# Patient Record
Sex: FEMALE | Race: BLACK OR AFRICAN AMERICAN | NOT HISPANIC OR LATINO | URBAN - METROPOLITAN AREA
[De-identification: names, ages, dates, MRNs, and addresses within clinical notes are randomized per-mention and may not be internally consistent; named-entity substitution may affect disease eponyms.]

---

## 2023-11-09 ENCOUNTER — HOSPITAL ENCOUNTER (EMERGENCY)
Facility: HOSPITAL | Age: 53
Discharge: HOME | End: 2023-11-09
Payer: COMMERCIAL

## 2023-11-09 ENCOUNTER — APPOINTMENT (OUTPATIENT)
Dept: RADIOLOGY | Facility: HOSPITAL | Age: 53
End: 2023-11-09
Payer: COMMERCIAL

## 2023-11-09 VITALS
BODY MASS INDEX: 25.71 KG/M2 | DIASTOLIC BLOOD PRESSURE: 87 MMHG | OXYGEN SATURATION: 94 % | HEIGHT: 66 IN | WEIGHT: 160 LBS | RESPIRATION RATE: 16 BRPM | HEART RATE: 65 BPM | SYSTOLIC BLOOD PRESSURE: 189 MMHG | TEMPERATURE: 96.6 F
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
## 2023-11-09 DIAGNOSIS — M79.18 MUSCULOSKELETAL PAIN: Primary | ICD-10-CM

## 2023-11-09 PROCEDURE — 99284 EMERGENCY DEPT VISIT MOD MDM: CPT | Performed by: NURSE PRACTITIONER

## 2023-11-09 PROCEDURE — 73130 X-RAY EXAM OF HAND: CPT | Mod: LT

## 2023-11-09 PROCEDURE — 73130 X-RAY EXAM OF HAND: CPT | Mod: LEFT SIDE | Performed by: STUDENT IN AN ORGANIZED HEALTH CARE EDUCATION/TRAINING PROGRAM

## 2023-11-09 PROCEDURE — 99285 EMERGENCY DEPT VISIT HI MDM: CPT | Mod: 25

## 2023-11-09 PROCEDURE — 99283 EMERGENCY DEPT VISIT LOW MDM: CPT | Mod: 25 | Performed by: NURSE PRACTITIONER

## 2023-11-09 RX ORDER — ACETAMINOPHEN 325 MG/1
650 TABLET ORAL ONCE
Status: COMPLETED | OUTPATIENT
Start: 2023-11-09 | End: 2023-11-09

## 2023-11-09 RX ORDER — METHOCARBAMOL 500 MG/1
500 TABLET, FILM COATED ORAL 2 TIMES DAILY PRN
Qty: 20 TABLET | Refills: 0 | Status: SHIPPED | OUTPATIENT
Start: 2023-11-09 | End: 2023-11-19

## 2023-11-09 RX ORDER — METFORMIN HYDROCHLORIDE 1000 MG/1
1000 TABLET ORAL
COMMUNITY
Start: 2018-08-27

## 2023-11-09 RX ORDER — HYDROCHLOROTHIAZIDE 50 MG/1
25 TABLET ORAL DAILY
COMMUNITY
Start: 2019-01-18

## 2023-11-09 RX ADMIN — ACETAMINOPHEN 650 MG: 325 TABLET ORAL at 21:03

## 2023-11-09 ASSESSMENT — COLUMBIA-SUICIDE SEVERITY RATING SCALE - C-SSRS
2. HAVE YOU ACTUALLY HAD ANY THOUGHTS OF KILLING YOURSELF?: NO
6. HAVE YOU EVER DONE ANYTHING, STARTED TO DO ANYTHING, OR PREPARED TO DO ANYTHING TO END YOUR LIFE?: NO
1. IN THE PAST MONTH, HAVE YOU WISHED YOU WERE DEAD OR WISHED YOU COULD GO TO SLEEP AND NOT WAKE UP?: NO
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
## 2024-11-24 ENCOUNTER — HOSPITAL ENCOUNTER (EMERGENCY)
Facility: HOSPITAL | Age: 54
Discharge: HOME | End: 2024-11-25
Attending: EMERGENCY MEDICINE
Payer: COMMERCIAL

## 2024-11-24 DIAGNOSIS — M54.50 ACUTE EXACERBATION OF CHRONIC LOW BACK PAIN: Primary | ICD-10-CM

## 2024-11-24 DIAGNOSIS — M54.50 LEFT-SIDED LOW BACK PAIN WITHOUT SCIATICA, UNSPECIFIED CHRONICITY: ICD-10-CM

## 2024-11-24 DIAGNOSIS — N30.00 ACUTE CYSTITIS WITHOUT HEMATURIA: ICD-10-CM

## 2024-11-24 DIAGNOSIS — G89.29 ACUTE EXACERBATION OF CHRONIC LOW BACK PAIN: Primary | ICD-10-CM

## 2024-11-24 LAB
ALBUMIN SERPL BCP-MCNC: 5 G/DL (ref 3.4–5)
ALP SERPL-CCNC: 46 U/L (ref 33–110)
ALT SERPL W P-5'-P-CCNC: 15 U/L (ref 7–45)
ANION GAP BLDV CALCULATED.4IONS-SCNC: 17 MMOL/L (ref 10–25)
ANION GAP SERPL CALC-SCNC: 26 MMOL/L (ref 10–20)
AST SERPL W P-5'-P-CCNC: 20 U/L (ref 9–39)
BASE EXCESS BLDV CALC-SCNC: 0.9 MMOL/L (ref -2–3)
BASOPHILS # BLD AUTO: 0.08 X10*3/UL (ref 0–0.1)
BASOPHILS NFR BLD AUTO: 1.2 %
BILIRUB SERPL-MCNC: 0.6 MG/DL (ref 0–1.2)
BODY TEMPERATURE: 37 DEGREES CELSIUS
BUN SERPL-MCNC: 26 MG/DL (ref 6–23)
CA-I BLDV-SCNC: 1.14 MMOL/L (ref 1.1–1.33)
CALCIUM SERPL-MCNC: 10.4 MG/DL (ref 8.6–10.6)
CHLORIDE BLDV-SCNC: 95 MMOL/L (ref 98–107)
CHLORIDE SERPL-SCNC: 92 MMOL/L (ref 98–107)
CO2 SERPL-SCNC: 24 MMOL/L (ref 21–32)
CREAT SERPL-MCNC: 1.15 MG/DL (ref 0.5–1.05)
EGFRCR SERPLBLD CKD-EPI 2021: 56 ML/MIN/1.73M*2
EOSINOPHIL # BLD AUTO: 0.2 X10*3/UL (ref 0–0.7)
EOSINOPHIL NFR BLD AUTO: 2.9 %
ERYTHROCYTE [DISTWIDTH] IN BLOOD BY AUTOMATED COUNT: 12.1 % (ref 11.5–14.5)
GLUCOSE BLD MANUAL STRIP-MCNC: 130 MG/DL (ref 74–99)
GLUCOSE BLDV-MCNC: 127 MG/DL (ref 74–99)
GLUCOSE SERPL-MCNC: 113 MG/DL (ref 74–99)
HCO3 BLDV-SCNC: 26.6 MMOL/L (ref 22–26)
HCT VFR BLD AUTO: 48.2 % (ref 36–46)
HCT VFR BLD EST: 50 % (ref 36–46)
HGB BLD-MCNC: 16.4 G/DL (ref 12–16)
HGB BLDV-MCNC: 16.6 G/DL (ref 12–16)
IMM GRANULOCYTES # BLD AUTO: 0.01 X10*3/UL (ref 0–0.7)
IMM GRANULOCYTES NFR BLD AUTO: 0.1 % (ref 0–0.9)
INHALED O2 CONCENTRATION: 21 %
LACTATE BLDV-SCNC: 1.2 MMOL/L (ref 0.4–2)
LACTATE BLDV-SCNC: 2.2 MMOL/L (ref 0.4–2)
LYMPHOCYTES # BLD AUTO: 2.97 X10*3/UL (ref 1.2–4.8)
LYMPHOCYTES NFR BLD AUTO: 42.9 %
MCH RBC QN AUTO: 29.5 PG (ref 26–34)
MCHC RBC AUTO-ENTMCNC: 34 G/DL (ref 32–36)
MCV RBC AUTO: 87 FL (ref 80–100)
MONOCYTES # BLD AUTO: 0.46 X10*3/UL (ref 0.1–1)
MONOCYTES NFR BLD AUTO: 6.6 %
NEUTROPHILS # BLD AUTO: 3.2 X10*3/UL (ref 1.2–7.7)
NEUTROPHILS NFR BLD AUTO: 46.3 %
NRBC BLD-RTO: 0 /100 WBCS (ref 0–0)
OXYHGB MFR BLDV: 47.4 % (ref 45–75)
PCO2 BLDV: 45 MM HG (ref 41–51)
PH BLDV: 7.38 PH (ref 7.33–7.43)
PLATELET # BLD AUTO: 381 X10*3/UL (ref 150–450)
PO2 BLDV: 32 MM HG (ref 35–45)
POTASSIUM BLDV-SCNC: 3.6 MMOL/L (ref 3.5–5.3)
POTASSIUM SERPL-SCNC: 3.7 MMOL/L (ref 3.5–5.3)
PROT SERPL-MCNC: 8.4 G/DL (ref 6.4–8.2)
RBC # BLD AUTO: 5.56 X10*6/UL (ref 4–5.2)
SAO2 % BLDV: 48 % (ref 45–75)
SODIUM BLDV-SCNC: 135 MMOL/L (ref 136–145)
SODIUM SERPL-SCNC: 138 MMOL/L (ref 136–145)
WBC # BLD AUTO: 6.9 X10*3/UL (ref 4.4–11.3)

## 2024-11-24 PROCEDURE — 36415 COLL VENOUS BLD VENIPUNCTURE: CPT | Performed by: EMERGENCY MEDICINE

## 2024-11-24 PROCEDURE — 84132 ASSAY OF SERUM POTASSIUM: CPT | Mod: 59 | Performed by: EMERGENCY MEDICINE

## 2024-11-24 PROCEDURE — 82947 ASSAY GLUCOSE BLOOD QUANT: CPT

## 2024-11-24 PROCEDURE — 81001 URINALYSIS AUTO W/SCOPE: CPT | Performed by: EMERGENCY MEDICINE

## 2024-11-24 PROCEDURE — 2500000004 HC RX 250 GENERAL PHARMACY W/ HCPCS (ALT 636 FOR OP/ED): Performed by: EMERGENCY MEDICINE

## 2024-11-24 PROCEDURE — 99284 EMERGENCY DEPT VISIT MOD MDM: CPT | Mod: 25

## 2024-11-24 PROCEDURE — 85025 COMPLETE CBC W/AUTO DIFF WBC: CPT | Performed by: EMERGENCY MEDICINE

## 2024-11-24 PROCEDURE — 99285 EMERGENCY DEPT VISIT HI MDM: CPT | Performed by: EMERGENCY MEDICINE

## 2024-11-24 PROCEDURE — 84132 ASSAY OF SERUM POTASSIUM: CPT | Performed by: EMERGENCY MEDICINE

## 2024-11-24 PROCEDURE — 2500000004 HC RX 250 GENERAL PHARMACY W/ HCPCS (ALT 636 FOR OP/ED)

## 2024-11-24 PROCEDURE — 83605 ASSAY OF LACTIC ACID: CPT | Performed by: EMERGENCY MEDICINE

## 2024-11-24 PROCEDURE — 87086 URINE CULTURE/COLONY COUNT: CPT | Performed by: EMERGENCY MEDICINE

## 2024-11-24 PROCEDURE — 96374 THER/PROPH/DIAG INJ IV PUSH: CPT

## 2024-11-24 RX ORDER — HYDROMORPHONE HYDROCHLORIDE 1 MG/ML
1 INJECTION, SOLUTION INTRAMUSCULAR; INTRAVENOUS; SUBCUTANEOUS ONCE
Status: COMPLETED | OUTPATIENT
Start: 2024-11-24 | End: 2024-11-24

## 2024-11-24 RX ORDER — PREDNISONE 20 MG/1
20 TABLET ORAL ONCE
Status: COMPLETED | OUTPATIENT
Start: 2024-11-24 | End: 2024-11-24

## 2024-11-24 RX ORDER — METHYLPREDNISOLONE 4 MG/1
TABLET ORAL
Qty: 21 TABLET | Refills: 0 | Status: SHIPPED | OUTPATIENT
Start: 2024-11-24 | End: 2024-12-01

## 2024-11-24 ASSESSMENT — COLUMBIA-SUICIDE SEVERITY RATING SCALE - C-SSRS
6. HAVE YOU EVER DONE ANYTHING, STARTED TO DO ANYTHING, OR PREPARED TO DO ANYTHING TO END YOUR LIFE?: NO
1. IN THE PAST MONTH, HAVE YOU WISHED YOU WERE DEAD OR WISHED YOU COULD GO TO SLEEP AND NOT WAKE UP?: NO
2. HAVE YOU ACTUALLY HAD ANY THOUGHTS OF KILLING YOURSELF?: NO

## 2024-11-24 ASSESSMENT — PAIN DESCRIPTION - LOCATION: LOCATION: BACK

## 2024-11-24 ASSESSMENT — PAIN DESCRIPTION - PAIN TYPE: TYPE: ACUTE PAIN

## 2024-11-24 ASSESSMENT — PAIN SCALES - GENERAL
PAINLEVEL_OUTOF10: 10 - WORST POSSIBLE PAIN
PAINLEVEL_OUTOF10: 10 - WORST POSSIBLE PAIN

## 2024-11-24 ASSESSMENT — PAIN DESCRIPTION - ORIENTATION: ORIENTATION: LEFT

## 2024-11-24 ASSESSMENT — PAIN - FUNCTIONAL ASSESSMENT: PAIN_FUNCTIONAL_ASSESSMENT: 0-10

## 2024-11-24 NOTE — ED TRIAGE NOTES
Reports back pain/ flank pain since getting switched off insulin and changed to metformin. She was recently admitted @ [facility]. Now patient is concerned because she is getting different readings with finger stick glucose monitor as opposed to CGM. Patient was discharged with follow up with nephrology (finding of atrophic kidney with lesion) but missed her appt. due to severe pain.

## 2024-11-25 VITALS
DIASTOLIC BLOOD PRESSURE: 77 MMHG | HEART RATE: 60 BPM | RESPIRATION RATE: 16 BRPM | BODY MASS INDEX: 19.29 KG/M2 | OXYGEN SATURATION: 97 % | SYSTOLIC BLOOD PRESSURE: 117 MMHG | WEIGHT: 120 LBS | TEMPERATURE: 97.9 F | HEIGHT: 66 IN

## 2024-11-25 LAB
APPEARANCE UR: ABNORMAL
BILIRUB UR STRIP.AUTO-MCNC: ABNORMAL MG/DL
COLOR UR: YELLOW
GLUCOSE UR STRIP.AUTO-MCNC: NORMAL MG/DL
GRAN CASTS #/AREA UR COMP ASSIST: ABNORMAL /LPF
HOLD SPECIMEN: NORMAL
HYALINE CASTS #/AREA URNS AUTO: ABNORMAL /LPF
KETONES UR STRIP.AUTO-MCNC: ABNORMAL MG/DL
LEUKOCYTE ESTERASE UR QL STRIP.AUTO: ABNORMAL
MUCOUS THREADS #/AREA URNS AUTO: ABNORMAL /LPF
NITRITE UR QL STRIP.AUTO: NEGATIVE
PH UR STRIP.AUTO: 5.5 [PH]
PROT UR STRIP.AUTO-MCNC: ABNORMAL MG/DL
RBC # UR STRIP.AUTO: ABNORMAL /UL
RBC #/AREA URNS AUTO: >20 /HPF
SP GR UR STRIP.AUTO: 1.03
SQUAMOUS #/AREA URNS AUTO: ABNORMAL /HPF
UROBILINOGEN UR STRIP.AUTO-MCNC: ABNORMAL MG/DL
WBC #/AREA URNS AUTO: >50 /HPF
WBC CLUMPS #/AREA URNS AUTO: ABNORMAL /HPF

## 2024-11-25 RX ORDER — CEPHALEXIN 500 MG/1
500 CAPSULE ORAL 4 TIMES DAILY
Qty: 28 CAPSULE | Refills: 0 | Status: SHIPPED | OUTPATIENT
Start: 2024-11-25 | End: 2024-12-02

## 2024-11-25 NOTE — DISCHARGE INSTRUCTIONS
You were seen in the emergency department for your low back pain.  It does not appear to be due to any kidney problems.  It appears that you have most of the effective treatments already in place.  Today we discussed adding physical therapy and a steroid in order to give you the best opportunity to heal.  The most important thing is that you keep moving and follow-up with these physical therapist.  Steroid will help with the pain in the short-term but will not fix the issue.  That is why it is so important that you follow-up with a physical therapist    It also appears that you have a urinary tract infection, for this we are going to send you an antibiotic to the pharmacy requested.  Please be sure to use it as directed on the bottle and be sure to take the entire prescription in order to help keep the infection from coming back

## 2024-11-25 NOTE — ED PROVIDER NOTES
History of Present Illness     History provided by: Patient  Limitations to History: None  External Records Reviewed with Brief Summary:  Patient is been seen multiple times for similar pain, see more information below    HPI:  Dinorah Barry is a 54 y.o. female with medical history notable for multiple surgeries on the stomach including as a child and an unspecified kidney mass as an infant which she states she had removed at age 1 day old.  She is coming in today for pain in the left low back which occasionally causes her pain in the left flank as well, pain has been ongoing for approximately 3 weeks.  She has been seen for this multiple times in different emergency departments including admission following CT scans which were notable for her kidney lesion which was known to her, and small volume ascites.   Per her today and based on previous notes, her pain is worse when she is up and moving around and improves with decreased movement and using lidocaine patches and heat.  Based on previous documentation, she had good response to Robaxin which she now denies.  Notes some mild changes in sensation in her RLE around her knee that is only present while she is walking but states that she is able to walk with pain.  She works as a  for [employer] and frequently gets into and out of her car and carries boxes.  She has been worked up multiple times for kidney stones which have not been identified.  Denies saddle anesthesia, loss of bowel or bladder control, fevers, chills, night sweats, N/V (other than the very first day that she had pain), weakness in the legs      Physical Exam   Triage vitals:  T 36.6 °C (97.9 °F)  HR (!) 110  /73  RR 18  O2 98 % None (Room air)    General: Awake, alert, in no acute distress  Eyes: Gaze conjugate.  No scleral icterus or injection  HENT: Normo-cephalic, atraumatic. No stridor  CV: Regular rate, regular rhythm. Radial pulses 2+ bilaterally  Resp: Breathing  non-labored, speaking in full sentences.  Clear to auscultation bilaterally  GI: Soft, non-distended, non-tender. No rebound or guarding.  MSK/Extremities: No gross bony deformities. Moving all extremities.  Is able to ambulate with normal gait.  Walks as though she is having back pain.  Midline spine is nontender, no bony step-offs.  Negative SLR bilaterally.  Negative CVA tenderness  Skin: Warm. Appropriate color.  No rash over the area of pain, the patient does have a lidocaine patch in place over the left buttock  Neuro: Alert. Oriented. Face symmetric. Speech is fluent.  Gross strength and sensation intact in b/l UE and LEs  Psych: Appropriate mood and affect    Medical Decision Making & ED Course   Medical Decision Makin y.o. female with above history and physical notable for low back pain for which she has been seen in the ED multiple times.  She has a history of kidney disease but multiple previous negative workups.  No CVA tenderness.  Due to previous negative workups, chronicity of pain, location of pain, and lack of CVA tenderness, I do not feel that this patient is suffering from a kidney stone or other kidney pathology.  No midline spinal tenderness or abnormal gait other than secondary to pain, denies saddle anesthesia and bowel/bladder incontinence, no fevers, not IV drug user.  For this reason I do not feel that this patient is suffering from an acute spinal pathology.  Examination is most consistent with back pain which is muscular in nature.  This is consistent with her history in which she describes that most helpful things for her are heat and rest.  It is further consistent with her job as an [employer]  frequently carrying boxes.    I had a discussion with the patient regarding previously used medications.  She states that the Dilaudid we gave her did help, I explained to her that opiates over a period of time can worsen outcomes and back pain.  I also explained to her the  benefits of using exercise as a way to help the back heal.  For this reason she agreed to a referral for physical therapy.  She is also using several medications which will continue to help her including lidocaine patches, Tylenol, and Robaxin.  She is not able to take NSAIDs due to her kidney and GI disease so I will continue to avoid these.  I discussed using steroids with her and explained to her that they would likely cause her sugars to increase and that this is an expected outcome.  She was still agreeable to receiving a short burst of steroids.  Will use Medrol Dosepak in order to assist patient with taking appropriately as I feel that the clarity of instructions on the Medrol Dosepak assists patients with getting through the prescribed dosages.    UA was also concerning for UTI.  There are also granular and hyaline casts.  Patient states that she feels that she has been dehydrated due to decreased fluid intake.  Due to critical fluid shortage in this ambulatory patient, I do not feel that it is appropriate to provide IV fluids in the ED.  I recommended that the patient continue to drink water as appropriate.  Additionally for her UTI I am prescribing Keflex.  Allergy to Augmentin is acknowledged but unlikely to be cross-reactive, in the setting of this known anaphylactic allergy I feel that the risk-benefit favors Keflex especially in this patient with several other medication intolerances    ED Course:  ED Course as of 11/25/24 0139   Sun Nov 24, 2024 2039 ED Attending Documentation: This patient was seen by the resident physician.  I have seen and examined the patient, agree with the workup, evaluation, management and diagnosis. I reviewed and edited the above documentation where necessary. On my evaluation of the patient: 54-year-old female who presents to the emergency department with left-sided back pain.  Is been seen multiple times for this and even admitted with concerns that it could be related to  an atrophic kidney however all of her workup at the multiple hospitals have been negative.  Work appears unremarkable, will treat symptomatically and have her follow-up with pain medicine.    Jewel Vasquez MD  EM Attending Physician   [RG]      ED Course User Index  [RG] Jewel Vasquez MD         Diagnoses as of 11/25/24 0139   Left-sided low back pain without sciatica, unspecified chronicity   Acute exacerbation of chronic low back pain   Acute cystitis without hematuria     ---      Social Determinants of Health which Significantly Impact Care: None identified     EKG Independent Interpretation: EKG not obtained    Independent Result Review and Interpretation: Relevant laboratory and radiographic results were reviewed and independently interpreted by myself.  As necessary, they are commented on in the ED Course.    Chronic conditions affecting the patient's care: As documented above in MDM    The patient was discussed with the following consultants/services: None    Care Considerations: As documented above in MDM      Disposition   As a result of the work-up, the patient was discharged home.  she was informed of her diagnosis and instructed to come back with any concerns or worsening of condition.  she and was agreeable to the plan as discussed above.  she was given the opportunity to ask questions.  All of the patient's questions were answered.    Procedures   Procedures    This was a shared visit with an ED attending.  The patient was seen and discussed with the ED attending    Jerson Kim MD  Emergency Medicine     Jerson iKm MD  Resident  11/25/24 0141

## 2024-11-26 LAB — BACTERIA UR CULT: NORMAL

## 2025-01-03 ENCOUNTER — EVALUATION (OUTPATIENT)
Dept: PHYSICAL THERAPY | Facility: CLINIC | Age: 55
End: 2025-01-03
Payer: COMMERCIAL

## 2025-01-03 DIAGNOSIS — M54.50 ACUTE EXACERBATION OF CHRONIC LOW BACK PAIN: ICD-10-CM

## 2025-01-03 DIAGNOSIS — M54.50 LEFT-SIDED LOW BACK PAIN WITHOUT SCIATICA, UNSPECIFIED CHRONICITY: Primary | ICD-10-CM

## 2025-01-03 DIAGNOSIS — G89.29 ACUTE EXACERBATION OF CHRONIC LOW BACK PAIN: ICD-10-CM

## 2025-01-03 PROCEDURE — 97161 PT EVAL LOW COMPLEX 20 MIN: CPT | Mod: GP

## 2025-01-03 PROCEDURE — 97110 THERAPEUTIC EXERCISES: CPT | Mod: GP

## 2025-01-03 NOTE — PROGRESS NOTES
Physical Therapy    Physical Therapy Evaluation and Treatment      Patient Name: Dinorah Barry  MRN: [id number]  Today's Date: 1/3/2025    Time Entry:   Time Calculation  Start Time: 1115  Stop Time: 1150  Time Calculation (min): 35 min  PT Evaluation Time Entry  PT Evaluation (Low) Time Entry: 20  PT Therapeutic Procedures Time Entry  Therapeutic Exercise Time Entry: 15  Insurance: [facility] Dual Complete  Visit: 1   Outcome measure DENICE: 20/50  Number of visits requested: 8  Date range of visits requested: 1/3/25-4/3/25     Assessment:  PT Assessment  Assessment Comment: Patient presents with signs and symptoms consistent with the physician's medical diagnosis of mechanical low back pain. She will benefit from medically necessary skilled physical therapy interventions to decrease pain and improve function with daily activities.     Plan:  OP PT Plan  Treatment/Interventions: Cryotherapy, Dry needling, Education/ Instruction, Electrical stimulation, Hot pack, Manual therapy, Neuromuscular re-education, Therapeutic activities, Therapeutic exercises  PT Plan: Skilled PT  PT Frequency: 1 time per week  Duration: 8 weeks  Certification Period Start Date: 01/03/25  Certification Period End Date: 04/03/25  Rehab Potential: Good  Plan of Care Agreement: Patient    Current Problem:   Problem List Items Addressed This Visit    None  Visit Diagnoses         Codes    Left-sided low back pain without sciatica, unspecified chronicity    -  Primary M54.50    Relevant Orders    Follow Up In Physical Therapy    Acute exacerbation of chronic low back pain     M54.50, G89.29              Subjective    General:  General  Reason for Referral: left sided low back pain  Referred By: Jewel Vasquez MD  Preferred Learning Style: auditory, kinesthetic, verbal, visual, written  General Comment: Patient is a 55 y/o female who was referred to outpatient physical therapy due to left sided low back pain. She presented to the ED on 11/24/24 due to  "low back pain. For pain relief she has used lidocaine patches, Tylenol, Robaxin and steroids. She also sleeps with a hot pack. The patient had a recent UTI which decreased her stamina and strength. Her low back pain is along the spine and the left sided paraspinals. Today she rates her pain at 6/10. She notes intermittent tingling in the right thigh. Patient notes that prolonged sitting increases her pain. The patient is a diabetic. The patient works in security and a goal of hers is to be able to stand for a long period of time.  Precautions:  Precautions  STEADI Fall Risk Score (The score of 4 or more indicates an increased risk of falling): 0  Pain:   6/10  Prior Level of Function:   Independent with all ADLs    Objective   Cognition:   WNL  General Assessments:  Posture Comment: Slightly forward flexed posture while seated.    Range of Motion Comments: Lumbar ROM:     Flexion 75%     Extension 75%      Right Rotation 75%     Left rotation 75%     Right Side Bend 75%     Left side bend 75%     Patient reports an increase in pain at end ROM extension.    Strength Comments: LE strength (R/L)     Hip flexion 4+/5, 4+/5     Hip extension 4+/5, 4+/5     Hip abduction 4+/5, 4+/5     Knee flexion 5/5, 5/5     Knee extension 5/5, 5/5     Ankle plantarflexion 5/5, 5/5     Ankle dorsiflexion 5/5, 5/5     Core strength: 3/5    Palpation Comment: Patient is tender to palpation along spinous processes L4-L5 and left sided lumbar paraspinal musculature.   Special Tests Comment: SLR negative bilaterally  Functional Assessments:  Gait Comment: Normal gait pattern.    Outcome Measures:  DENICE: 20/50    Treatments:  Therapeutic Exercise  Therapeutic Exercise Performed: Yes  Therapeutic Exercise Activity 1: LTR 10 x 5\"  Therapeutic Exercise Activity 2: open book 10 x 10\"  Therapeutic Exercise Activity 3: piriformis stretch 3 x 30\"  Therapeutic Exercise Activity 4: supine PPT 20 x 5\"    EDUCATION:   Home exercise program daily. "     Goals:  Active       PT Problem       Patient will report compliance with her home exercise program.         Start:  01/03/25    Expected End:  04/03/25            Patient will report improvement via the DENICE to demonstrate improved activity tolerance.        Start:  01/03/25    Expected End:  04/03/25            Patient will report a decreased pain level to 0/10.         Start:  01/03/25    Expected End:  04/03/25            Patient will demonstrate improvements in core strength to 5/5 to facilitate bending and lifting.        Start:  01/03/25    Expected End:  04/03/25

## 2025-01-16 ENCOUNTER — DOCUMENTATION (OUTPATIENT)
Dept: PHYSICAL THERAPY | Facility: CLINIC | Age: 55
End: 2025-01-16
Payer: COMMERCIAL

## 2025-01-16 NOTE — PROGRESS NOTES
Physical Therapy                 Therapy Communication Note    Patient Name: Dinorah Barry  MRN: [id number]  Department:   Room: Room/bed info not found  Today's Date: 1/16/2025     Discipline: Physical Therapy          Missed Visit Reason:      Missed Time: Cancel    Comment:

## 2025-01-29 ENCOUNTER — DOCUMENTATION (OUTPATIENT)
Dept: PHYSICAL THERAPY | Facility: CLINIC | Age: 55
End: 2025-01-29
Payer: COMMERCIAL

## 2025-01-29 NOTE — PROGRESS NOTES
Physical Therapy                 Therapy Communication Note    Patient Name: Dinorah Barry  MRN: [id number]  Department:   Room: Room/bed info not found  Today's Date: 2025     Discipline: Physical Therapy          Missed Visit Reason:      Missed Time: No Show    Comment:        Physical Therapy    Discharge Summary    Name: Dinorah Barry  MRN: [id number]  : 1970  Date: 25    Discharge Summary: PT    Discharge Information: Date of discharge 25, Date of last visit 1/3/25, Date of evaluation 1/3/25, Number of attended visits 1, Referred by Jewel Vasquez MD, and Referred for left sided low back pain    Therapy Summary: Plan of care consisted of therapeutic exercise to improve low back flexibility and core strength to decrease LBP with ADLs.     Discharge Status: Discharge to a home exercise program.      Rehab Discharge Reason: Failed to schedule and/or keep follow-up appointment(s)